# Patient Record
Sex: MALE | Race: ASIAN | NOT HISPANIC OR LATINO | URBAN - METROPOLITAN AREA
[De-identification: names, ages, dates, MRNs, and addresses within clinical notes are randomized per-mention and may not be internally consistent; named-entity substitution may affect disease eponyms.]

---

## 2022-09-08 ENCOUNTER — EMERGENCY (EMERGENCY)
Facility: HOSPITAL | Age: 37
LOS: 1 days | Discharge: HOME | End: 2022-09-08
Attending: EMERGENCY MEDICINE
Payer: COMMERCIAL

## 2022-09-08 VITALS
RESPIRATION RATE: 19 BRPM | TEMPERATURE: 98 F | SYSTOLIC BLOOD PRESSURE: 121 MMHG | OXYGEN SATURATION: 98 % | HEART RATE: 83 BPM | DIASTOLIC BLOOD PRESSURE: 66 MMHG | HEIGHT: 72 IN | WEIGHT: 164.91 LBS

## 2022-09-08 LAB
ALBUMIN SERPL ELPH-MCNC: 4.4 G/DL — SIGNIFICANT CHANGE UP (ref 3.5–5)
ALP SERPL-CCNC: 69 U/L — SIGNIFICANT CHANGE UP (ref 40–120)
ALT FLD-CCNC: 29 U/L DA — SIGNIFICANT CHANGE UP (ref 10–60)
ANION GAP SERPL CALC-SCNC: 1 MMOL/L — LOW (ref 5–17)
APPEARANCE UR: CLEAR — SIGNIFICANT CHANGE UP
AST SERPL-CCNC: 17 U/L — SIGNIFICANT CHANGE UP (ref 10–40)
BASOPHILS # BLD AUTO: 0.04 K/UL — SIGNIFICANT CHANGE UP (ref 0–0.2)
BASOPHILS NFR BLD AUTO: 0.6 % — SIGNIFICANT CHANGE UP (ref 0–2)
BILIRUB SERPL-MCNC: 0.5 MG/DL — SIGNIFICANT CHANGE UP (ref 0.2–1.2)
BILIRUB UR-MCNC: NEGATIVE — SIGNIFICANT CHANGE UP
BUN SERPL-MCNC: 15 MG/DL — SIGNIFICANT CHANGE UP (ref 7–18)
CALCIUM SERPL-MCNC: 9 MG/DL — SIGNIFICANT CHANGE UP (ref 8.4–10.5)
CHLORIDE SERPL-SCNC: 107 MMOL/L — SIGNIFICANT CHANGE UP (ref 96–108)
CO2 SERPL-SCNC: 30 MMOL/L — SIGNIFICANT CHANGE UP (ref 22–31)
COLOR SPEC: YELLOW — SIGNIFICANT CHANGE UP
CREAT SERPL-MCNC: 1 MG/DL — SIGNIFICANT CHANGE UP (ref 0.5–1.3)
DIFF PNL FLD: NEGATIVE — SIGNIFICANT CHANGE UP
EGFR: 101 ML/MIN/1.73M2 — SIGNIFICANT CHANGE UP
EOSINOPHIL # BLD AUTO: 0.31 K/UL — SIGNIFICANT CHANGE UP (ref 0–0.5)
EOSINOPHIL NFR BLD AUTO: 4.9 % — SIGNIFICANT CHANGE UP (ref 0–6)
GLUCOSE SERPL-MCNC: 88 MG/DL — SIGNIFICANT CHANGE UP (ref 70–99)
GLUCOSE UR QL: NEGATIVE — SIGNIFICANT CHANGE UP
HCT VFR BLD CALC: 45 % — SIGNIFICANT CHANGE UP (ref 39–50)
HGB BLD-MCNC: 14.8 G/DL — SIGNIFICANT CHANGE UP (ref 13–17)
IMM GRANULOCYTES NFR BLD AUTO: 0.3 % — SIGNIFICANT CHANGE UP (ref 0–1.5)
KETONES UR-MCNC: NEGATIVE — SIGNIFICANT CHANGE UP
LEUKOCYTE ESTERASE UR-ACNC: NEGATIVE — SIGNIFICANT CHANGE UP
LIDOCAIN IGE QN: 89 U/L — SIGNIFICANT CHANGE UP (ref 73–393)
LYMPHOCYTES # BLD AUTO: 1.56 K/UL — SIGNIFICANT CHANGE UP (ref 1–3.3)
LYMPHOCYTES # BLD AUTO: 24.8 % — SIGNIFICANT CHANGE UP (ref 13–44)
MCHC RBC-ENTMCNC: 31.8 PG — SIGNIFICANT CHANGE UP (ref 27–34)
MCHC RBC-ENTMCNC: 32.9 GM/DL — SIGNIFICANT CHANGE UP (ref 32–36)
MCV RBC AUTO: 96.6 FL — SIGNIFICANT CHANGE UP (ref 80–100)
MONOCYTES # BLD AUTO: 0.66 K/UL — SIGNIFICANT CHANGE UP (ref 0–0.9)
MONOCYTES NFR BLD AUTO: 10.5 % — SIGNIFICANT CHANGE UP (ref 2–14)
NEUTROPHILS # BLD AUTO: 3.69 K/UL — SIGNIFICANT CHANGE UP (ref 1.8–7.4)
NEUTROPHILS NFR BLD AUTO: 58.9 % — SIGNIFICANT CHANGE UP (ref 43–77)
NITRITE UR-MCNC: NEGATIVE — SIGNIFICANT CHANGE UP
NRBC # BLD: 0 /100 WBCS — SIGNIFICANT CHANGE UP (ref 0–0)
PH UR: 7 — SIGNIFICANT CHANGE UP (ref 5–8)
PLATELET # BLD AUTO: 256 K/UL — SIGNIFICANT CHANGE UP (ref 150–400)
POTASSIUM SERPL-MCNC: 4.4 MMOL/L — SIGNIFICANT CHANGE UP (ref 3.5–5.3)
POTASSIUM SERPL-SCNC: 4.4 MMOL/L — SIGNIFICANT CHANGE UP (ref 3.5–5.3)
PROT SERPL-MCNC: 7.8 G/DL — SIGNIFICANT CHANGE UP (ref 6–8.3)
PROT UR-MCNC: 15
RBC # BLD: 4.66 M/UL — SIGNIFICANT CHANGE UP (ref 4.2–5.8)
RBC # FLD: 12.4 % — SIGNIFICANT CHANGE UP (ref 10.3–14.5)
SODIUM SERPL-SCNC: 138 MMOL/L — SIGNIFICANT CHANGE UP (ref 135–145)
SP GR SPEC: 1 — LOW (ref 1.01–1.02)
UROBILINOGEN FLD QL: NEGATIVE — SIGNIFICANT CHANGE UP
WBC # BLD: 6.28 K/UL — SIGNIFICANT CHANGE UP (ref 3.8–10.5)
WBC # FLD AUTO: 6.28 K/UL — SIGNIFICANT CHANGE UP (ref 3.8–10.5)

## 2022-09-08 PROCEDURE — 99284 EMERGENCY DEPT VISIT MOD MDM: CPT | Mod: 25

## 2022-09-08 PROCEDURE — 99285 EMERGENCY DEPT VISIT HI MDM: CPT

## 2022-09-08 PROCEDURE — 80053 COMPREHEN METABOLIC PANEL: CPT

## 2022-09-08 PROCEDURE — 83690 ASSAY OF LIPASE: CPT

## 2022-09-08 PROCEDURE — 74176 CT ABD & PELVIS W/O CONTRAST: CPT | Mod: 26,MA

## 2022-09-08 PROCEDURE — 36415 COLL VENOUS BLD VENIPUNCTURE: CPT

## 2022-09-08 PROCEDURE — 81001 URINALYSIS AUTO W/SCOPE: CPT

## 2022-09-08 PROCEDURE — 74176 CT ABD & PELVIS W/O CONTRAST: CPT | Mod: MA

## 2022-09-08 PROCEDURE — 85025 COMPLETE CBC W/AUTO DIFF WBC: CPT

## 2022-09-08 NOTE — ED PROVIDER NOTE - PROGRESS NOTE DETAILS
patient signed out to me by Dr. Lafleur pending labs and CT results. patient updated on all results. no RUQ abdominal pain. stable for discharge. Bashir Lama

## 2022-09-08 NOTE — ED PROVIDER NOTE - DISPOSITION TYPE
[facility] Medicine Daily Progress Note    Date of Service  1/6/2019    Chief Complaint  Left hip pain after a fall    Hospital Course      88 y.o. female with past medical history of atrial fibrillation on anticoagulation, hypothyroidism, pulmonary hypertension Estimated right ventricular systolic pressure  is 55 mmHg, coronary artery disease, gastroesophageal reflux disease admitted 12/28/2018 with left hip pain and swelling after a ground-level fall.  Orthopedics consulted, found to have marked anemia with a hemoglobin of 7.6 transfused 1 unit of blood as the patient was symptomatic and she had a plan for surgery that day. Has Tolerated Intramedullary nail, left hip on 12/29/2018, noticed to have acute kidney injury, diuretics were stopped.  Nephrotoxic medications were stopped.  She needed 2 units of blood transfusion, physical and occupational therapy recommended [facility], referral placed        Interval Problem Update   1/2- Patient attempting to work with therapies, states she is unable to do most of exercises asked. Patient is not near baseline according to family. Pain controlled while patient is in bed, but states it hurts to move. Will need SNF placement prior to return home to help with strengthening.    1/3- Patient upset this morning regarding when bumex is ordered, have adjusted medication. Patient continues to have drainage at surgical site, will hold anticoagulants per ortho recommendations. Scheduled to start back on Eliquis on 1/5. Patient continues to require significant help with ADL's and mobility, SNF pending acceptance.   1/4- Patient laying in bed, does not appear to be in good spirits today. Only answers questions with few words. Incision vac placed to left hip. Drainage appears improved with holding of anticoagulation. Will continue with therapies and pursuing placement. Large BM on 1/3.   1/5 patient was transferred to telemetry last night due to episode of sharp left-sided chest pain and  heaviness that started when patient was laying in the breath, lasted 30 minutes to 1 hour, was worsening with movements, associated with subjective shortness of breath.  Currently chest pain-free.  A. fib with paced rhythm on EKG, 11 beats of V. tach asymptomatic.  Heart rate 62..  Currently on room air.  Troponin is not elevated.  We will repeat a troponin   Anticoagulation: Apixaban dose increased up to full dose according patients weight.  1/6 no more chest pain reported.  CTA of pulmonary artery negative.  Showed hiatal hernia.  Patient is resting comfortably in her bed without signs of distress    Consultants/Specialty   Orthopedics    Code Status  DNR    Disposition  SNF, referral placed 12/31/2018     Review of Systems  Review of Systems   Constitutional: Negative for chills, fever and weight loss.   HENT: Negative for congestion (in her nose ), ear discharge, ear pain, nosebleeds and sore throat.    Eyes: Negative for blurred vision, double vision and photophobia.   Respiratory: Negative for cough and shortness of breath.    Cardiovascular: Negative for chest pain, palpitations, orthopnea and leg swelling.   Gastrointestinal: Negative for abdominal pain, diarrhea and vomiting.   Genitourinary: Positive for frequency (due to diuretic). Negative for dysuria, hematuria and urgency.   Musculoskeletal: Positive for myalgias. Negative for back pain, joint pain (left hip ) and neck pain.        Pain left hip and buttocks   Neurological: Positive for weakness. Negative for dizziness, tingling, focal weakness and headaches.   Endo/Heme/Allergies: Negative for environmental allergies and polydipsia. Does not bruise/bleed easily.   Psychiatric/Behavioral: Negative for depression, substance abuse and suicidal ideas. Insomnia: states she did not sleep well          Physical Exam  Temp:  [36.2 °C (97.2 °F)-36.8 °C (98.2 °F)] 36.2 °C (97.2 °F)  Pulse:  [60-70] 60  Resp:  [16-18] 16  BP: (111-132)/(54-68) 111/55    Physical  Exam   Constitutional: She is oriented to person, place, and time. Vital signs are normal. She appears well-developed. No distress. Nasal cannula in place.   Thin female   HENT:   Head: Normocephalic and atraumatic.   Right Ear: External ear normal.   Left Ear: External ear normal.   Eyes: Conjunctivae and lids are normal.   Neck: Normal range of motion. No tracheal tenderness present.   Cardiovascular: Normal rate.  Exam reveals no gallop.    Murmur (Systolic) heard.  Pulmonary/Chest: Effort normal and breath sounds normal. No respiratory distress. She has no decreased breath sounds. She has no wheezes.   Abdominal: Soft. Bowel sounds are normal. She exhibits no distension. There is no tenderness.   Musculoskeletal: She exhibits tenderness (Left hip).   Neurological: She is alert and oriented to person, place, and time.   Skin: Skin is warm and dry. Bruising (to left hip surgical site ) and ecchymosis noted. No rash noted. She is not diaphoretic.   Dressings to left leg, surgical incision vac in place to hip    Psychiatric: Her speech is normal. Judgment normal. Her mood appears anxious. Her affect is blunt. Her affect is not labile. Cognition and memory are impaired.   Nursing note and vitals reviewed.      Fluids    Intake/Output Summary (Last 24 hours) at 01/06/19 1635  Last data filed at 01/06/19 1000   Gross per 24 hour   Intake              550 ml   Output                0 ml   Net              550 ml       Laboratory  Recent Labs      01/04/19 0530 01/05/19 0226   WBC  3.6*  3.6*   RBC  2.43*  2.41*   HEMOGLOBIN  7.4*  7.3*   HEMATOCRIT  23.7*  23.6*   MCV  97.5  97.9*   MCH  30.5  30.3   MCHC  31.2*  30.9*   RDW  60.6*  59.7*   PLATELETCT  136*  148*   MPV  10.4  10.6     Recent Labs      01/04/19   0530  01/05/19 0225   SODIUM  139  141   POTASSIUM  3.3*  3.8   CHLORIDE  105  107   CO2  28  28   GLUCOSE  104*  104*   BUN  45*  45*   CREATININE  1.22  1.11   CALCIUM  8.5  8.7                    Imaging  CT-CTA CHEST PULMONARY ARTERY W/ RECONS   Final Result      1.  There is no CT evidence of acute pulmonary embolism.   2.  There is atherosclerosis with coronary artery calcification.   3.  The heart is enlarged.   4.  The dependent small right and small left pleural effusion are unchanged.   5.  Right greater than left bibasilar airspace disease is unchanged.   6.  Large hiatal hernia is again seen and is increased in size.            DX-CHEST-PORTABLE (1 VIEW)   Final Result      1.  Hyperinflation. No focal consolidation. Small bilateral pleural effusions.   2.  Stable cardiomegaly.      DX-HIP-UNILATERAL-W/O PELVIS-2/3 VIEWS LEFT   Final Result      Intraoperative fluoroscopic spot images as described above.      DX-PORTABLE FLUORO > 1 HOUR   Final Result      Intraoperative fluoroscopic spot images as described above.      CT-HIP W/O PLUS RECONS LEFT   Final Result      Acute significantly displaced intertrochanteric fracture of the left femur.      DX-FEMUR-2+ LEFT   Final Result            Acute significantly displaced intertrochanteric fracture of the left femur.           Assessment/Plan  * Femur fracture (HCC)- (present on admission)   Assessment & Plan    Ortho consulted- surgery 12/29/2018   Pain control  PT OT evaluation recommended SNF, referral placed   Incision vac placed to help with drainage      Chest pain   Assessment & Plan    Troponin is not elevated.  EKG is showing paced rhythm  Chest pain resolved.  Probably noncardiogenic chest pain.  Possibly secondary to hiatal hernia       Acute kidney injury (HCC)   Assessment & Plan    Creatinine stable  Acute kidney injury, mostly due to hypovolemia, blood loss recent surgery, nonsteroidal anti-inflammatory drugs  Stopped Toradol, allopurinol    bumetanide initially held, then restarted     Kidney function improved     Anemia- (present on admission)   Assessment & Plan    Symptomatic anemia requiring blood transfusion  Initially patient  was refusing transfusion, provider had a prolonged discussion with patient and family members.  Eventually all parties agreed on blood transfusion.    Received 2 units of blood transfusion  Metoprolol, Eliquis, initially held for surgery, then restarted, now held for 48 hours due to drainage from surgical site   Continue to monitor hemoglobin and transfuse as needed for hemoglobin below 7 or higher if patient is symptomatic.     Plavix held for surgery, and anemia, consider restarting when okay with surgery   Hemoglobin low stable 7. 3.  No obvious bleeding     Acute respiratory failure with hypoxia (HCC)- (present on admission)   Assessment & Plan    Chest x-ray showed hyperinflation, small bilateral basilar infusion  CTA negative for pulmonary embolism  Otherwise hypoxia probably secondary to emphysema/COPD  Incentive spirometry,  RT protocol     Coronary artery disease- (present on admission)   Assessment & Plan    Metoprolol, rosuvastatin  Clopidogrel initially held for surgery, restart when okay per surgery     Pancytopenia (HCC)- (present on admission)   Assessment & Plan    Chronic per reccords   B12 WNL   Cont to monitor    Status post 2 units of blood transfusion       COPD (chronic obstructive pulmonary disease) (HCC)- (present on admission)   Assessment & Plan    Not in acute exacerbation   Respiratory protocol, oxygen as needed       Chronic diastolic CHF (congestive heart failure) (HCC)- (present on admission)   Assessment & Plan    Not in acute exacerbation   On metoprolol, rosuvastatin   Recieved 2 units transfusion  Does not appear to have fluid overload  Monitor       Chronic atrial fibrillation (HCC)- (present on admission)   Assessment & Plan    Metoprolol,   Eliquis, initially held for surgery, then restarted, now being held for 48 hours due to increased drainage at surgical site    Restarted     Weakness- (present on admission)   Assessment & Plan    Diffuse weakness, needs continued therapies    SNF placement, awaiting acceptance      DNR (do not resuscitate)- (present on admission)   Assessment & Plan    Discussion with patient and family.      Hypothyroidism- (present on admission)   Assessment & Plan    Resume levo      GERD (gastroesophageal reflux disease)- (present on admission)   Assessment & Plan    Omperazole          VTE prophylaxis: On Eliquis       DISCHARGE

## 2022-09-08 NOTE — ED PROVIDER NOTE - PATIENT PORTAL LINK FT
You can access the FollowMyHealth Patient Portal offered by [facility] by registering at the following website: [email or web address]. By joining [employer]’s FollowMyHealth portal, you will also be able to view your health information using other applications (apps) compatible with our system.

## 2022-09-08 NOTE — ED PROVIDER NOTE - NSFOLLOWUPINSTRUCTIONS_ED_ALL_ED_FT
You were seen in the emergency department for abdominal pain.     Please follow-up with your primary care doctor in the next 24-48 hours.     If you have any worsening symptoms, severe headache, chest pain, shortness of breath, nausea or vomiting, please return ot the emergency department.

## 2022-09-08 NOTE — ED PROVIDER NOTE - OBJECTIVE STATEMENT
36 year old male with no PHMx or past surgical history presents to the ED with complaints of intermittent right lateral lower abdominal pain since this morning. Patient denies injury, fever, chills, nausea, vomiting, dysuria, hematuria, diarrhea, and constipation. Patient reports he is a nonsmoker.   NKDA.

## 2024-10-11 NOTE — ED ADULT NURSE NOTE - CHIEF COMPLAINT
Patient/Caregiver provided printed discharge information. The patient is a 36y Male complaining of abdominal pain.